# Patient Record
Sex: MALE | Race: WHITE
[De-identification: names, ages, dates, MRNs, and addresses within clinical notes are randomized per-mention and may not be internally consistent; named-entity substitution may affect disease eponyms.]

---

## 2019-11-08 NOTE — MHHPEPDOC
General


Date Of Admission:  Nov 7, 2019


Legal Status:  9.39


Chief Complaint


"I've had thoughts of suicide most of my life."





History of Present Illness


HISTORY OF THE PRESENT ILLNESS: Patient is a 19 -year-old , AD, male, 

with history of depression and SA by hanging that he never sought out help for 

and no previous psychiatric admissions who was brought to ED by his friends 

after he endorsed SI to them with plan to either cut himself or OD.  In ED pt 

had cuts to his forearm and neck that weren't severe and bandaged to prevent 

bleeding as well as multiple scars on his arms from previous cutting and burn 

cigarettes on them.  Pt in ED stated that he has felt depressed and hopeless 

most of his life and came across as guarded, irritable, making sarcastic 

responses when seen.  He denied any current stressors stating "if I knew I'd 

tell you."





Psychiatric Review of Systems


Depression (2 or more weeks):  depressed mood, feelings of worthlesness, 

suicidal thoughts


Christel (4 or more days of):  denies


Psychosis:  denies


Anxiety:  gen/non-specific anxiety


Anxiety/ 6 months or more of:  difficulty concentrating, irritability, muscle 

tension





Past Psychiatric History


Previous Psychiatric Diagnosis: Depression


Previous Psychiatric Admissions: denies


Suicide Attempts: SA by hanging that he never sought help for, self harm thru 

cutting and burning self with cigarettes


Psychiatric Follow-up: [facility]


Psychiatric medications: lexapro 10mg daily





Past Medical History


Medical Problems


denies


Head Injury:  No


Seizures:  No


Hospitalizations:  No


Surgeries:  Yes (tonsilectomy)





Family Medical/Psychiatric HX


Medical Problems


noncontributory


Psychiatric Disorders:  Yes (believes his mother suffers from mental health 

problems)


Addiction:  No


Suicide Attemps/Completions:  No





Addiction History


nicotine, alcohol (bal 0.05 (drank 1 beer last night, only drinks occasionally))





Social History


Childhood: Born and raised in [place], moved to [place] in 8th grade, 2 parent ho

me until they divorce when he was 5y/o and mother remarried, has close 

relationship with parents, stepfather; 2 step brothers he gets along with well. 

Good childhood overall.


Abuse/Trauma:denies


Current Living Situation: [place]


Education: high school grad


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 

apprentice, getting certification for microbiology analytics 


Legal: denies


Marital: single, never , no kids





Mental Status Examination


General Appearance:  well groomed, appears stated age, hospital scubs/clothing, 

lacerations (lt forearm and neck mild), healed scars (arms)


Build:  average


Demeanor:  average, guarded


Eye Contact:  average


Activity:  average


Behavior:  cooperative


Speech:  clear, spontaneous, reg/rate,rhythm,volume


Mood:  depressed, anxious, irritable


Mood


"I've felt suicidal all my life."


Affect:  constricted, flat, congruent


Thought Process:  logical/linear, depressed, intact


Thought Content (Delusions):  none reported, denies SI, HI, AVH (endorses 

chronic SI most of his life with plan to cut himself or OD)


Thought Content (Other):  guarded, appropriate


Thought Content (Aggressive):  none reported


Perception (Hallucinations):  none reported


Perception (Other):  none reported


Cognition (Impairment of):  none reported


Cognition(Intelligence Est.):  average


Oriented:  Awake, Alert, Oriented times three


Insight:  poor


Judgment:  Poor


Psychosis:  Denies





Diagnoses


Major Depressive D/O recurrent severe w/o psychosis


R/O cluster b traits





A-FIB/CHADSVASC


A-FIB History


Current/History of A-Fib/PAF?:  No





Assessment


Pt seen and states he's here b/c he's felt depressed and suicidal most of his 

life.  Denies any traumatic events in his life for him to feel the was he dose. 

States the first time he had thoughts of "not wanting to be around at 5 or 5y/[age]"

but denies that anything happened during the time that he can related it too.  

States he feels overall "numb" and harms himself to feel some type of emotion.  

States now when he self harms he feels "under-whelmed" and self-harms himself 

more severely to feel something due to not feeling enough emotional 

relief/exhilaration if his cuts/burn are mild.  Discussed methodology of CBT for

improvement of chronic depressive thoughts as admit thinking of suicidal makes 

him feel better and less depressed and it appears to be more a maladaptive 

coping mechanism for him.  States he started lexapro 1mo ago but hasn't really 

notices any improvement in his mood and is agreeable to increasing it. He is 

future oriented toward job interests in the future (refer social history) and is

very interested in tattoos and knows a lot of information about them that he 

discussed them with confidence and appeared in affect to have a euthymic and 

bright mood during that time.  Denies SI currently, denies HI, hallucinations, 

delusions.  Feels safe here.





Initial Treatment Plan


1. Patient was admitted on a 9.39 status.


2. Complete history was obtained.


3. With patients permission, family will be contacted and database will be 

expanded. 


4. Patients medication regimen will be reviewed and changed accordingly. 


5. Patient will be provided with protected environment. 


6. Patient will be treated with individual, group, and milieu therapies. 


7. Patient will receive supportive psych-education.


8. Discharge planning will commence immediately.


9. Outpatient follow-up treatment will be strongly recommended.


10. The initial treatment plan will focus initially on:


* Depression.


* Risk for suicide.


11. increase lexapro to 20mg daily for mood





ESTIMATED LENGTH OF STAY: 5-7 DAYS.





TIME SPENT COUNSELING AND COORDINATING INITIAL CARE: 60 minutes.





Vital Signs





Vital Signs








  Date Time  Temp Pulse Resp B/P (MAP) Pulse Ox O2 Delivery O2 Flow Rate FiO2


 


11/8/19 09:57      Room Air  


 


11/8/19 06:47 97.7 67 12 119/59 (79)    


 


11/8/19 00:11     96   











Laboratory Data


[facility] Labs


Laboratory Tests 2


11/7/19 22:02: 


Nucleated Red Blood Cells % (auto) 0.0, Anion Gap 6L, Calcium Level 9.0, Total 

Bilirubin 0.4, Direct Bilirubin < 0.1, Aspartate Amino Transf (AST/SGOT) 14, 

Alanine Aminotransferase (ALT/SGPT) 32, Alkaline Phosphatase 118H, Total Protein

7.7, Albumin 4.4, Albumin/Globulin Ratio 1.33, Thyroid Stimulating Hormone (TSH)

0.766, Salicylates Level 2.3L, Urine Opiates Screen NEGATIVE, Urine Methadone 

Screen NEGATIVE, Acetaminophen Level < 2.0L, Urine Barbiturates Screen NEGATIVE,

Urine Phencyclidine Screen NEGATIVE, Urine Amphetamines Screen NEGATIVE, Urine 

Benzodiazepines Screen NEGATIVE, Urine Cocaine Metabolite Screen NEGATIVE, Urine

Cannabinoids Screen NEGATIVE, Ethyl Alcohol Level 0.050H


CBC/BMP


Laboratory Tests


11/7/19 22:02











Medications


Scheduled


Escitalopram Oxalate (Lexapro) 10 Mg Tablet, 10 MG PO DAILY, (Reported)





Allergies


Coded Allergies:  


     No Known Allergies (Unverified , 11/7/19)











SHARATH ROBLES DO          Nov 8, 2019 11:36 am

## 2019-11-08 NOTE — HPEPDOC
General


Date of Admission


Nov 7, 2019 at 23:34


Date of Service:  Nov 8, 2019


Chief Complaint


The patient is a 19-year-old male admitted with a reason for visit of 

Unspecified  Depressive D/O.





History of Present Illness


19 years old white male with past medical history of no significant disease was 

admitted to inpatient mental health unit with the diagnosis of suicidal 

ideations.


Patient offers no new medical complaints on interview





Home Medications


Scheduled


Escitalopram Oxalate (Lexapro) 10 Mg Tablet, 10 MG PO DAILY, (Reported)





Allergies


Coded Allergies:  


     No Known Allergies (Unverified , 11/7/19)





Past Medical History


Medical History


No medical history of any significant disease


Surgical History


Tonsillectomy





Family History


Significant Family History:  No pertinent family hx





Social History


* Smoker:  current smoker


Alcohol:  occationally


Drugs:  denies





A-FIB/CHADSVASC


A-FIB History


Current/History of A-Fib/PAF?:  No





Review of Systems


Constitutional:  Denies: Chills, Fever, Malaise, Night Sweats, Weakness, 

Fatigue, Weight Loss, Lethargy, Other


Eyes:  Denies: Pain, Vision change, Conjunctivae inflammation, Eyelid inflammati

on, Redness, Other


ENT:  Denies: Head Aches, Ear Pain, Dysphagia, Sinus Congestion, Post Nasal 

Drip, Sore Throat, Epistaxis, Other Symptoms


Skin:  Denies: Rash, Lesions, Jaundice, Bruising, Itching, Dry, Breakdown, Nail 

Changes, Other


Pulmonary:  Denies: Dyspnea, Cough, Pleuritic Chest Pain, Other Symptoms


Cardiovascular:  Denies: Chest Pain, Palpitations, Orthopnea, Paroxysmal Noc. 

Dyspnea, Edema, Lt Headedness, Other Symptoms


Gastrointestinal:  Denies: Nausea, Vomiting, Abdominal Pain, Diarrhea, 

Constipation, Melena, Hematochezia, Other Symptoms


Genitourinary:  Denies: Dysuria, Frequency, Incontinence, Hematuria, Retention, 

Other Symptoms


Hematologic:  Denies: Bruising, Bleeding Excessively, Petecchia, Purpura, 

Enlarged Lymph Nodes, Other Hematologic


Endocrine:  Denies: Polydipsia, Polyphagia, Polyuria, Heat Intolerance, Cold 

Intolerance, Other Endocrine Sx


Musculoskeletal:  Denies: Neck Pain, Back Pain, Shoulder Pain, Arm Pain, Hand 

Pain, Leg Pain, Foot Pain, Joint Pain, Muscle Pain, Spasms, Other Symptoms


Neurological:  Denies: Weakness, Numbness, Incoordination, Change in speech, 

Confusion, Seizures, Other Symptoms


Psych:  Denies: Mood Normal, Anxiety, Depression, Memory Issues, Thoughts of 

Self Harm, Anger, Thoughts of Harming Other, Other Psych





Physical Examination


General Exam:  Positive: Alert, Cooperative


Eye Exam:  Positive: PERRLA, Conjunctiva & lids normal


ENT Exam:  Positive: Atraumatic


Neck Exam:  Positive: Supple, JVD


Chest Exam:  Positive: Clear to auscultation, Normal air movement


Heart Exam:  Positive: Rate Normal, Normal S1, Normal S2


Abdomen Exam:  Positive: Normal bowel sounds, Soft


Extremity Exam:  Positive: Normal pulses


Skin Exam:  Positive: Nl turgor and temperature


Neuro Exam:  Positive: Strength at 5/5 X4 ext, Sensation Intact


Psych Exam:  Positive: Mental status NL, Oriented x 3





Vital Signs





Vital Signs








  Date Time  Temp Pulse Resp B/P (MAP) Pulse Ox O2 Delivery O2 Flow Rate FiO2


 


11/8/19 06:47 97.7 67 12 119/59 (79)  Room Air  


 


11/8/19 00:11     96   











Laboratory Data


Labs 24[facility]


Laboratory Tests 2


11/7/19 22:02: 


Nucleated Red Blood Cells % (auto) 0.0, Anion Gap 6L, Calcium Level 9.0, Total 

Bilirubin 0.4, Direct Bilirubin < 0.1, Aspartate Amino Transf (AST/SGOT) 14, 

Alanine Aminotransferase (ALT/SGPT) 32, Alkaline Phosphatase 118H, Total Protein

7.7, Albumin 4.4, Albumin/Globulin Ratio 1.33, Thyroid Stimulating Hormone (TSH)

0.766, Salicylates Level 2.3L, Urine Opiates Screen NEGATIVE, Urine Methadone 

Screen NEGATIVE, Acetaminophen Level < 2.0L, Urine Barbiturates Screen NEGATIVE,

Urine Phencyclidine Screen NEGATIVE, Urine Amphetamines Screen NEGATIVE, Urine 

Benzodiazepines Screen NEGATIVE, Urine Cocaine Metabolite Screen NEGATIVE, Urine

Cannabinoids Screen NEGATIVE, Ethyl Alcohol Level 0.050H


CBC/BMP


Laboratory Tests


11/7/19 22:02











Problems





(1) Suicidal ideations


Status:  Acute


Problem Text:  19 years old white male with past medical history and no sig

nificant disease, active smoker, admitted with the diagnosis of suicidal 

ideations.


Patient offers no medical complaint the present time


Patient admitted to inpatient mental health unit


Individual and group therapies as per psych


Negation intervention as per psychiatry





(2) Depressive disorder, not elsewhere classified


Status:  Chronic


Problem Text:  As per psychiatry





(3) Hypokalemia


Status:  Acute


Problem Text:  KCl 40 mEq by mouth 1 dose


Balanced diet. Counseling was done


All other electrolytes and CBC are essentially within normal limits








Plan / VTE


VTE Prophylaxis Ordered?:  No


VTE Exclusion Mechanical Proph:  Low Risk for VTE


VTE Exclusion Pharmacological:  At Low Risk for VTE











JESUS VILLANUEVA MD               Nov 8, 2019 09:57

## 2019-11-09 NOTE — MHIPNPDOC
[facility] Progress Note


Progress Note








Inpatient Progress Note


Paulino Leger


MRN: N/[id number]


Date of Birth: N/A


Date of Service: 11/09/2019


History of Present Illness


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 

becoming severely depressed and suicidal.





Interval History


The patient's met with today. He reports he's doing "okay." He's met in his room

where he's sleeping. He does report feeling tired in the bed and reports he has 

not noticed any benefits from his medications at this time, however, he also 

reports he has not had any detriment. He has been engaging in programming, but 

has not engaged in a significant behavioral problems and has been generally 

amenable to staff. He reports that his depression still is difficult to deal 

with with some low mood and loss of interest at times.





Review Of Systems


General: Denies fever or appetite changes





Cardiovascular: Denies Chest pain or palpations





GI: Denies Nausea, vomiting, or bowel changes





Respiratory: Denies shortness of breath or cough





Neuro: Denies dizziness, tremors





Derm: Denies any rashes or pruritus





: Denies any dysuria or urinary dysfunction





MSK: Denies any muscle tightness or stiffness





HEENT: Denies any vision changes or headaches





Heme/Lymph: denies any bruising or bleeding





Endo: denies any cold/heat intolerance or water intake changes





Psychotherapy


None on this visit.





Vital Signs


Reviewed.





Mental Status Examination


General: Well dressed with good hygiene





Speech: Spontaneous and fluid





Thought processes: Linear and logical





MSK: Smooth and coordinated gait, no signs of tremors or involuntary orofacial 

movements





Thought content: Future orientated





Abstract reasoning, and computation: Intact





Description of associations: Intact





Description of abnormal or psychotic thoughts: Denies any suicidal or homicidal 

ideation. Denies any auditory or visual hallucinations. Does not appear to be 

responding to internal stimuli. Does not appear to be endorsing any bizarre or 

paranoid ideation.





Judgment: fair





Insight: fair





Orientation: Alert and orientated 3





Cognition: Grossly normal





Recent and remote memory: Intact





Attention span and concentration: Intact





Fund of knowledge: Adequate





Mood: "Fine"





Affect: Dysthymic with a constricted range





Diagnoses


Unspecified depression.





Assessment and Plan


Depressive disorder: Continue medications as below.





Disposition


The patient will need a further inpatient admission in order to safety plan and 

observe on medications. Potential discharge on Tuesday has been discussed, 

however, further observation will be needed.





Time Spent


15 minutes face-to-face.





Saturday





Vital Signs





Vital Signs








  Date Time  Temp Pulse Resp B/P (MAP) Pulse Ox O2 Delivery O2 Flow Rate FiO2


 


11/9/19 07:35 98.0 74 12 114/57 (76)    


 


11/8/19 09:57      Room Air  


 


11/8/19 00:11     96   











Current Medications





Current Medications








 Medications


  (Trade)  Dose


 Ordered  Sig/Ruben


 Route


 PRN Reason  Start Time


 Stop Time Status Last Admin


Dose Admin


 


 Acetaminophen


  (Tylenol Tab)  650 mg  Q6HP  PRN


 PO


 HEADACHE or DISCOMFORT  11/7/19 23:45


     





 


 Al Hydrox/Mg


 Hydrox/Simethicone


  (Mylanta)  30 ml  Q4HP  PRN


 PO


 HEARTBURN/INDIGESTION  11/7/19 23:45


     





 


 Escitalopram


 Oxalate


  (Lexapro)  20 mg  DAILY


 PO


   11/9/19 09:00


    11/9/19 08:49





 


 Folic Acid


  (Folic Acid)  1 mg  DAILY


 PO


   11/8/19 09:00


 11/8/19 10:05 DC 11/8/19 09:24





 


 Home Med


  (Med Rec


 Complete!)    ASDIRECTED


 XX


   11/7/19 23:45


 11/7/19 23:44 DC  





 


 Lorazepam


  (Ativan)  2 mg  ASDIRECTED  PRN


 PO


 SEE PROTOCOL  11/7/19 23:45


   Cancel  





 


 Magnesium


 Hydroxide


  (Milk Of


 Magnesia)  30 ml  DAILYPRN  PRN


 PO


 CONSTIPATION  11/7/19 23:45


     





 


 Multivitamins


  (Theragram-M)  1 tab  DAILY


 PO


   11/8/19 09:00


 11/8/19 10:05 DC 11/8/19 09:24





 


 Nicotine


  (Nicoderm Cq


 21mg)  1 patch  DAILY


 TD


   11/8/19 09:00


    11/9/19 08:49





 


 Thiamine HCl


  (Thiamine HCl)  100 mg  BID


 PO


   11/7/19 21:00


 11/10/19 09:01  11/9/19 08:49





 


 Trazodone HCl


  (Desyrel)  50 mg  QHSP  PRN


 PO


 INSOMNIA  11/7/19 23:45


     














Allergies


Coded Allergies:  


     No Known Allergies (Unverified , 11/7/19)











VANDANA GUNN DO               Nov 9, 2019 09:53

## 2019-11-10 NOTE — MHIPNPDOC
[facility] Progress Note


Progress Note








Inpatient Progress Note


Paulino Leger


MRN: N/A


Date of Birth: N/A


Date of Service: 11/10/2019


History of Present Illness


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 

becoming severely depressed and suicidal.





Interval History


The patient has met with today. He reports his depression is improving with less

fatigue and loss of interest. His sleep was interrupted by a cold draft, but 

otherwise reports that he is doing well. He reports that he feels somewhat 

sedated in the daytime after taking his Lexapro. Other than that he reports and 

per nursing staff note that he is doing well on the unit. No major behavioral 

problems going to groups.





Review Of Systems


General: Denies fever or appetite changes 





Cardiovascular: Denies Chest pain or palpations





GI: Denies Nausea, vomiting, or bowel changes





Respiratory: Denies shortness of breath or cough





Neuro: Denies dizziness, tremors





Derm: Denies any rashes or pruritus





: Denies any dysuria or urinary problems 





MSK: Denies any muscle tightness or stiffness





HEENT: Denies any vision changes or headaches





Heme/Lymph: denies any bruising or bleeding





Endo: denies any cold/heat intolerance or water intake changes





Psychotherapy


None on this visit.





Vital Signs


Reviewed.





Mental Status Examination


General: Well dressed with good hygiene





Speech: Spontaneous and fluid





Thought processes: Linear and logical





MSK: Smooth and coordinated gait, no signs of tremors or involuntary orofacial 

movements





Thought content: Future orientated





Abstract reasoning, and computation: Intact





Description of associations: Intact





Description of abnormal or psychotic thoughts: Denies any suicidal or homicidal 

ideation. Denies any auditory or visual hallucinations. Does not appear to be 

responding to internal stimuli. Does not appear to be endorsing any bizarre or 

paranoid ideation.





Judgment: fair





Insight: fair





Orientation: Alert and orientated 3





Cognition: Grossly normal





Recent and remote memory: Intact





Attention span and concentration: Intact





Fund of knowledge: Adequate





Mood: "okay"





Affect: Euthymic with a full range





Diagnoses


Unspecified depression.





Assessment and Plan


Depressive disorder: We'll change Lexapro to QHS dosing.





Disposition


Per Dr. Hernandez' plan.





Time Spent


10 minutes face-to-face.





Sunday





Vital Signs





Vital Signs








  Date Time  Temp Pulse Resp B/P (MAP) Pulse Ox O2 Delivery O2 Flow Rate FiO2


 


11/10/19 06:29 98.7 69 12 102/62 (75)    


 


11/8/19 09:57      Room Air  


 


11/8/19 00:11     96   











Current Medications





Current Medications








 Medications


  (Trade)  Dose


 Ordered  Sig/Ruben


 Route


 PRN Reason  Start Time


 Stop Time Status Last Admin


Dose Admin


 


 Acetaminophen


  (Tylenol Tab)  650 mg  Q6HP  PRN


 PO


 HEADACHE or DISCOMFORT  11/7/19 23:45


     





 


 Al Hydrox/Mg


 Hydrox/Simethicone


  (Mylanta)  30 ml  Q4HP  PRN


 PO


 HEARTBURN/INDIGESTION  11/7/19 23:45


     





 


 Escitalopram


 Oxalate


  (Lexapro)  20 mg  DAILY


 PO


   11/9/19 09:00


    11/10/19 08:36





 


 Folic Acid


  (Folic Acid)  1 mg  DAILY


 PO


   11/8/19 09:00


 11/8/19 10:05 DC 11/8/19 09:24





 


 Home Med


  (Med Rec


 Complete!)    ASDIRECTED


 XX


   11/7/19 23:45


 11/7/19 23:44 DC  





 


 Lorazepam


  (Ativan)  2 mg  ASDIRECTED  PRN


 PO


 SEE PROTOCOL  11/7/19 23:45


   Cancel  





 


 Magnesium


 Hydroxide


  (Milk Of


 Magnesia)  30 ml  DAILYPRN  PRN


 PO


 CONSTIPATION  11/7/19 23:45


     





 


 Multivitamins


  (Theragram-M)  1 tab  DAILY


 PO


   11/8/19 09:00


 11/8/19 10:05 DC 11/8/19 09:24





 


 Nicotine


  (Nicoderm Cq


 21mg)  1 patch  DAILY


 TD


   11/8/19 09:00


    11/10/19 08:42





 


 Thiamine HCl


  (Thiamine HCl)  100 mg  BID


 PO


   11/7/19 21:00


 11/10/19 09:01 DC 11/10/19 08:36





 


 Trazodone HCl


  (Desyrel)  50 mg  QHSP  PRN


 PO


 INSOMNIA  11/7/19 23:45


     














Allergies


Coded Allergies:  


     No Known Allergies (Unverified , 11/7/19)











VANDANA GUNN DO              Nov 10, 2019 12:57

## 2019-11-11 NOTE — MHIPNPDOC
[facility] Progress Note


Progress Note


DATE OF SERVICE: 11/11/19





HISTORY: Patient is a 19 -year-old , AD, male, with history of 

depression and SA by hanging that he never sought out help for and no previous 

psychiatric admissions who was brought to ED by his friends after he endorsed SI

to them with plan to either cut himself or OD.  In ED pt had cuts to his forearm

and neck that weren't severe and bandaged to prevent bleeding as well as 

multiple scars on his arms from previous cutting and burn cigarettes on them.  

Pt in ED stated that he has felt depressed and hopeless most of his life and 

came across as guarded, irritable, making sarcastic responses when seen.  He 

denied any current stressors stating "if I knew I'd tell you."


Pt seen and states he's here b/c he's felt depressed and suicidal most of his 

life.  Denies any traumatic events in his life for him to feel the was he dose. 

States the first time he had thoughts of "not wanting to be around at 5 or 7y/o"

but denies that anything happened during the time that he can related it too.  

States he feels overall "numb" and harms himself to feel some type of emotion.  

States now when he self harms he feels "under-whelmed" and self-harms himself 

more severely to feel something due to not feeling enough emotional 

relief/exhilaration if his cuts/burn are mild.  Discussed methodology of CBT for

improvement of chronic depressive thoughts as admit thinking of suicidal makes 

him feel better and less depressed and it appears to be more a maladaptive 

coping mechanism for him.  States he started lexapro 1mo ago but hasn't really 

notices any improvement in his mood and is agreeable to increasing it. He is 

future oriented toward job interests in the future (refer social history) and is

very interested in tattoos and knows a lot of information about them that he 

discussed them with confidence and appeared in affect to have a euthymic and 

bright mood during that time.  Denies SI currently, denies HI, hallucinations, 

delusions.  Feels safe here.





VITAL SIGNS: See below.





NEW TEST RESULTS: See below.





CURRENT MEDICATIONS: See below.





MENTAL STATUS EXAMINATION:


General Appearance:  well groomed, appears stated age, hospital scrubs/clothing,

lacerations (lt forearm and neck mild), healed scars (arms)


Build:  average


Demeanor:  average, cooperative


Eye Contact:  average


Activity:  average


Behavior:  cooperative


Speech:  clear, spontaneous, reg/rate,rhythm,volume


Mood:  less depressed, less anxious


Mood


"better"


Affect:  more full range and euthymic, congruent


Thought Process:  logical/linear, less depressed, intact


Thought Content (Delusions):  none reported, denies SI, HI, AVH (denies SI with 

plan to cut himself or OD)


Thought Content (Other):  appropriate


Thought Content (Aggressive):  none reported


Perception (Hallucinations):  none reported


Perception (Other):  none reported


Cognition (Impairment of):  none reported


Cognition(Intelligence Est.):  average


Oriented:  Awake, Alert, Oriented times three


Insight:  fair


Judgment:  fair


Psychosis:  Denies





DIAGNOSES:


Major Depressive D/O recurrent severe w/o psychosis


R/O cluster b traits


 


ASSESSMENT:Pt seen and states that his mood is better with the increase in 

lexapro.  States he's finding it less sedating since he started taking it 

nightly.  States he slept well last night.  Feels he is tolerating his 

medications and they're beneficial. He is attending groups and finding them 

helpful.  Denies thoughts to self harm since he's been here.  Appears to have 

more full, euthymic affect.  He denies SI/HI, hallucinations, delusions.  Pt 

feels safe here.





MANAGEMENT PLAN: continue plan


Medications:


lexapro to 20mg qhs


trazodone 50mg qhs prn insomnia





TIME SPENT: 30 minutes.





Vital Signs





Vital Signs








  Date Time  Temp Pulse Resp B/P (MAP) Pulse Ox O2 Delivery O2 Flow Rate FiO2


 


11/11/19 06:37 98.8 68 16 105/65 (78)    


 


11/8/19 09:57      Room Air  


 


11/8/19 00:11     96   











Current Medications





Current Medications








 Medications


  (Trade)  Dose


 Ordered  Sig/Ruben


 Route


 PRN Reason  Start Time


 Stop Time Status Last Admin


Dose Admin


 


 Acetaminophen


  (Tylenol Tab)  650 mg  Q6HP  PRN


 PO


 HEADACHE or DISCOMFORT  11/7/19 23:45


     





 


 Al Hydrox/Mg


 Hydrox/Simethicone


  (Mylanta)  30 ml  Q4HP  PRN


 PO


 HEARTBURN/INDIGESTION  11/7/19 23:45


     





 


 Escitalopram


 Oxalate


  (Lexapro)  20 mg  DAILY


 PO


   11/9/19 09:00


 11/10/19 19:28 DC 11/10/19 08:36





 


 Escitalopram


 Oxalate


  (Lexapro)  20 mg  QHS


 PO


   11/10/19 21:00


 11/10/19 20:36 DC  





 


 Escitalopram


 Oxalate


  (Lexapro)  20 mg  QHS


 PO


   11/10/19 21:00


 11/10/19 21:03 DC  





 


 Escitalopram


 Oxalate


  (Lexapro)  20 mg  QHS


 PO


   11/11/19 21:00


     





 


 Folic Acid


  (Folic Acid)  1 mg  DAILY


 PO


   11/8/19 09:00


 11/8/19 10:05 DC 11/8/19 09:24





 


 Home Med


  (Med Rec


 Complete!)    ASDIRECTED


 XX


   11/7/19 23:45


 11/7/19 23:44 DC  





 


 Lorazepam


  (Ativan)  2 mg  ASDIRECTED  PRN


 PO


 SEE PROTOCOL  11/7/19 23:45


   Cancel  





 


 Magnesium


 Hydroxide


  (Milk Of


 Magnesia)  30 ml  DAILYPRN  PRN


 PO


 CONSTIPATION  11/7/19 23:45


     





 


 Multivitamins


  (Theragram-M)  1 tab  DAILY


 PO


   11/8/19 09:00


 11/8/19 10:05 DC 11/8/19 09:24





 


 Nicotine


  (Nicoderm Cq


 21mg)  1 patch  DAILY


 TD


   11/8/19 09:00


    11/10/19 08:42





 


 Thiamine HCl


  (Thiamine HCl)  100 mg  BID


 PO


   11/7/19 21:00


 11/10/19 09:01 DC 11/10/19 08:36





 


 Trazodone HCl


  (Desyrel)  50 mg  QHSP  PRN


 PO


 INSOMNIA  11/7/19 23:45


     














Allergies


Coded Allergies:  


     No Known Allergies (Unverified , 11/7/19)











SHARATH ROBLES DO          Nov 11, 2019 8:34 am

## 2019-11-12 NOTE — MHDSPDOC
[facility] Discharge Summary


Discharge Summary


DATE OF ADMISSION: Nov 7, 2019 at 11:34 pm 


DATE OF DISCHARGE: Nov 12, 2019





DISCHARGE DIAGNOSES:


Major Depressive D/O recurrent severe w/o psychosis


R/O cluster b traits





REASON FOR ADMISSION:Patient is a 19 -year-old , AD, male, with history

of depression and SA by hanging that he never sought out help for and no 

previous psychiatric admissions who was brought to ED by his friends after he 

endorsed SI to them with plan to either cut himself or OD.  In ED pt had cuts to

his forearm and neck that weren't severe and bandaged to prevent bleeding as 

well as multiple scars on his arms from previous cutting and burn cigarettes on 

them.  Pt in ED stated that he has felt depressed and hopeless most of his life 

and came across as guarded, irritable, making sarcastic responses when seen.  He

denied any current stressors stating "if I knew I'd tell you."


Pt seen and states he's here b/c he's felt depressed and suicidal most of his 

life.  Denies any traumatic events in his life for him to feel the was he dose. 

States the first time he had thoughts of "not wanting to be around at 5 or 5y/o"

but denies that anything happened during the time that he can related it too.  

States he feels overall "numb" and harms himself to feel some type of emotion.  

States now when he self harms he feels "under-whelmed" and self-harms himself 

more severely to feel something due to not feeling enough emotional 

relief/exhilaration if his cuts/burn are mild.  Discussed methodology of CBT for

improvement of chronic depressive thoughts as admit thinking of suicidal makes 

him feel better and less depressed and it appears to be more a maladaptive 

coping mechanism for him.  States he started lexapro 1mo ago but hasn't really 

notices any improvement in his mood and is agreeable to increasing it. He is 

future oriented toward job interests in the future (refer social history) and is

very interested in tattoos and knows a lot of information about them that he 

discussed them with confidence and appeared in affect to have a euthymic and 

bright mood during that time.  Denies SI currently, denies HI, hallucinations, 

delusions.  Feels safe here.





CONSULTANTS INVOLVED: none





TREATMENT AND PROGRESS ON THE UNIT : Pt was admitted to [facility], seen for 

psychiatric assessment and started on lexapro increase to 20mg and change to 

nightly dosing for daytime fatigue with daily dosing.  He was provided trazodone

50mg qhs prn insomnia.  Pt found his medications beneficial and tolerated them 

well.  He attended groups daily during his stay.  His symptoms of depression, 

thoughts/urges to self harm, and SI improved with treatment.  He was educated on

coping tools he could do to prevent him from future self harm and stated he 

would do them after discharge.  On day of discharge he denied depression, 

anxiety, insomnia, SI/HI, hallucinations, delusions.  He was discharged home 

after [employer] meeting with follow-up at [facility].  He felt safe for discharge.





DISCHARGE ASSESSMENT: Pt seen and states that his mood is good and he's looking 

forward to going home with his [employer] today.  States he's finding his lexapro 

beneficial for his mood and is tolerating it well.  States he slept well last 

night.  He denies urges to self harm and SI.  He is attending groups and finding

them helpful.  Denies thoughts to self harm since he's been here.  Appears to 

have a full, euthymic affect.  He denies depression, anxiety, insomnia, 

thoughts/urges to self harm, SI/HI, hallucinations, delusions.  Pt feels safe to

be discharged home with his [employer] today.





MENTAL STATUS EXAMINATION ON DISCHARGE: 


General Appearance:  well groomed, appears stated age, hospital scrubs/clothing,

lacerations (lt forearm and neck mild), healed scars (arms)


Build:  average


Demeanor:  average, cooperative


Eye Contact:  average


Activity:  average


Behavior:  cooperative


Speech:  clear, spontaneous, reg/rate,rhythm,volume


Mood:  euthymic, full range


Mood


"good"


Affect:  full range and euthymic, congruent


Thought Process:  logical/linear, intact


Thought Content (Delusions):  none reported, denies SI, HI, AVH 


Thought Content (Other):  appropriate


Thought Content (Aggressive):  none reported


Perception (Hallucinations):  none reported


Perception (Other):  none reported


Cognition (Impairment of):  none reported


Cognition(Intelligence Est.):  average


Oriented:  Awake, Alert, Oriented times three


Insight:  fair-good


Judgment:  good


Psychosis:  Denies





MEDICATIONS ON DISCHARGE:


lexapro to 20mg qhs


trazodone 50mg qhs prn insomnia





PLAN/FOLLOWUP ARRANGEMENTS: D/c home with [facility] with follow-up at [facility].





The amount of time spent in the coordination of care for this patient was 

approximately 30 minutes.





Vital Signs/I&Os





Vital Signs








  Date Time  Temp Pulse Resp B/P (MAP) Pulse Ox O2 Delivery O2 Flow Rate FiO2


 


11/12/19 06:32 98.7 73 12 122/69 (86)  Room Air  


 


11/8/19 00:11     96   











Medications


Scheduled


Escitalopram Oxalate (Lexapro) 10 Mg Tablet, 10 MG PO DAILY, (Reported)





Allergies


Coded Allergies:  


     No Known Allergies (Unverified , 11/7/19)











SHARATH ROBLES DO          Nov 12, 2019 8:49 am

## 2020-01-21 ENCOUNTER — HOSPITAL ENCOUNTER (INPATIENT)
Dept: HOSPITAL 53 - M ED | Age: 20
LOS: 3 days | Discharge: PSYCHIATRIC OR SUBSTANCE USE FACILITY | DRG: 885 | End: 2020-01-24
Attending: PSYCHIATRY & NEUROLOGY | Admitting: PSYCHIATRY & NEUROLOGY
Payer: COMMERCIAL

## 2020-01-21 VITALS — WEIGHT: 161.16 LBS | HEIGHT: 66 IN | BODY MASS INDEX: 25.9 KG/M2

## 2020-01-21 VITALS — SYSTOLIC BLOOD PRESSURE: 122 MMHG | DIASTOLIC BLOOD PRESSURE: 68 MMHG

## 2020-01-21 VITALS — DIASTOLIC BLOOD PRESSURE: 73 MMHG | SYSTOLIC BLOOD PRESSURE: 117 MMHG

## 2020-01-21 DIAGNOSIS — F17.200: ICD-10-CM

## 2020-01-21 DIAGNOSIS — R45.851: ICD-10-CM

## 2020-01-21 DIAGNOSIS — Z79.899: ICD-10-CM

## 2020-01-21 DIAGNOSIS — Z88.8: ICD-10-CM

## 2020-01-21 DIAGNOSIS — F33.2: Primary | ICD-10-CM

## 2020-01-21 LAB
ALBUMIN SERPL BCG-MCNC: 4.5 GM/DL (ref 3.2–5.2)
ALT SERPL W P-5'-P-CCNC: 32 U/L (ref 12–78)
AMPHETAMINES UR QL SCN: NEGATIVE
APAP SERPL-MCNC: < 2 UG/ML (ref 10–30)
BARBITURATES UR QL SCN: NEGATIVE
BENZODIAZ UR QL SCN: NEGATIVE
BILIRUB CONJ SERPL-MCNC: 0.1 MG/DL (ref 0–0.2)
BILIRUB SERPL-MCNC: 0.3 MG/DL (ref 0.2–1)
BUN SERPL-MCNC: 12 MG/DL (ref 7–18)
BZE UR QL SCN: NEGATIVE
CALCIUM SERPL-MCNC: 9.7 MG/DL (ref 8.5–10.1)
CANNABINOIDS UR QL SCN: NEGATIVE
CHLORIDE SERPL-SCNC: 105 MEQ/L (ref 98–107)
CO2 SERPL-SCNC: 29 MEQ/L (ref 21–32)
CREAT SERPL-MCNC: 0.9 MG/DL (ref 0.7–1.3)
ETHANOL SERPL-MCNC: < 0.003 % (ref 0–0.01)
GLUCOSE SERPL-MCNC: 106 MG/DL (ref 70–100)
HCT VFR BLD AUTO: 49.9 % (ref 42–52)
HGB BLD-MCNC: 16.6 G/DL (ref 13.5–17.5)
MCH RBC QN AUTO: 30 PG (ref 27–33)
MCHC RBC AUTO-ENTMCNC: 33.3 G/DL (ref 32–36.5)
MCV RBC AUTO: 90.1 FL (ref 80–96)
METHADONE UR QL SCN: NEGATIVE
OPIATES UR QL SCN: NEGATIVE
PCP UR QL SCN: NEGATIVE
PLATELET # BLD AUTO: 222 10^3/UL (ref 150–450)
POTASSIUM SERPL-SCNC: 4.2 MEQ/L (ref 3.5–5.1)
PROT SERPL-MCNC: 7.5 GM/DL (ref 6.4–8.2)
RBC # BLD AUTO: 5.54 10^6/UL (ref 4.3–6.1)
SALICYLATES SERPL-MCNC: < 1.7 MG/DL (ref 5–30)
SODIUM SERPL-SCNC: 140 MEQ/L (ref 136–145)
TSH SERPL DL<=0.005 MIU/L-ACNC: 0.56 UIU/ML (ref 0.46–3.98)
WBC # BLD AUTO: 8.4 10^3/UL (ref 4–10)

## 2020-01-21 NOTE — HPEPDOC
General


Date of Admission


Jan 21, 2020 at 02:12


Date of Service:  Jan 21, 2020


Chief Complaint


The patient is a 19-year-old male admitted with a reason for visit of 

Unspecified Depressive D/O.


Source:  Patient


Exam Limitations:  No limitations


Timing/Duration:  Other (not applicable)


Severity:  Other (not applicable)


Associated Symptoms:  Other (not applicable)





History of Present Illness


19 years old white male with past medical history of no significant medical 

disease, had an argument with his girlfriend and became suicidal and sent by 

ambulance to emergency room for psychiatric care.


Patient was admitted to inpatient mental health unit with suicidal ideations. 

Patient denies any other medical issues or complaints at the present time.





Home Medications


Scheduled


Sertraline Hcl (Zoloft) 50 Mg Tablet, 50 MG PO DAILY, (Reported)





Allergies


Coded Allergies:  


     sertraline (Verified  Adverse Reaction, Severe, SUICIDAL THOUGHTS, 

DROWSINESS, 1/21/20)





Past Medical History


Medical History


None


Surgical History


Tonsillectomy





Family History


Significant Family History:  No pertinent family hx





Social History


* Smoker:  current smoker


Alcohol:  occationally


Drugs:  denies





A-FIB/CHADSVASC


A-FIB History


Current/History of A-Fib/PAF?:  No





Review of Systems


Constitutional:  Denies: Chills, Fever, Malaise, Night Sweats, Weakness, 

Fatigue, Weight Loss, Lethargy, Other


Eyes:  Denies: Pain, Vision change, Conjunctivae inflammation, Eyelid 

inflammation, Redness, Other


ENT:  Denies: Head Aches, Ear Pain, Dysphagia, Sinus Congestion, Post Nasal 

Drip, Sore Throat, Epistaxis, Other Symptoms


Skin:  Denies: Rash, Lesions, Jaundice, Bruising, Itching, Dry, Breakdown, Nail 

Changes, Other


Pulmonary:  Denies: Dyspnea, Cough, Pleuritic Chest Pain, Other Symptoms


Cardiovascular:  Denies: Chest Pain, Palpitations, Orthopnea, Paroxysmal Noc. 

Dyspnea, Edema, Lt Headedness, Other Symptoms


Gastrointestinal:  Denies: Nausea, Vomiting, Abdominal Pain, Diarrhea, 

Constipation, Melena, Hematochezia, Other Symptoms


Genitourinary:  Denies: Dysuria, Frequency, Incontinence, Hematuria, Retention, 

Other Symptoms


Hematologic:  Denies: Bruising, Bleeding Excessively, Petecchia, Purpura, 

Enlarged Lymph Nodes, Other Hematologic


Endocrine:  Denies: Polydipsia, Polyphagia, Polyuria, Heat Intolerance, Cold 

Intolerance, Other Endocrine Sx


Neurological:  Denies: Weakness, Numbness, Incoordination, Change in speech, 

Confusion, Seizures, Other Symptoms


Psych:  Reports: Other Psych





Physical Examination


General Exam:  Positive: Alert, Cooperative


Eye Exam:  Positive: PERRLA, Conjunctiva & lids normal


ENT Exam:  Positive: Atraumatic, Mucous membr. moist/pink


Neck Exam:  Positive: Supple


Chest Exam:  Positive: Clear to auscultation, Normal air movement


Heart Exam:  Positive: Rate Normal, Normal S1, Normal S2


Abdomen Exam:  Positive: Normal bowel sounds, Soft


Extremity Exam:  Positive: Normal pulses


Skin Exam:  Positive: Nl turgor and temperature


Neuro Exam:  Positive: Strength at 5/5 X4 ext, Cranial Nerves 3-12 NL


Psych Exam:  Positive: Mood NL, Oriented x 3





Vital Signs





Vital Signs








  Date Time  Temp Pulse Resp B/P (MAP) Pulse Ox O2 Delivery O2 Flow Rate FiO2


 


1/21/20 03:14 98.7 68 18 117/73 (88) 99 Room Air  











Laboratory Data


Labs 24H


Laboratory Tests 2


1/21/20 01:05: 


Nucleated Red Blood Cells % (auto) 0.0, Anion Gap 6L, Calcium Level 9.7, Total 

Bilirubin 0.3, Direct Bilirubin 0.1, Aspartate Amino Transf (AST/SGOT) 19, 

Alanine Aminotransferase (ALT/SGPT) 32, Alkaline Phosphatase 91, Total Protein 

7.5, Albumin 4.5, Albumin/Globulin Ratio 1.50, Thyroid Stimulating Hormone (TSH)

0.557, Salicylates Level < 1.7L, Urine Opiates Screen NEGATIVE, Urine Methadone 

Screen NEGATIVE, Acetaminophen Level < 2.0L, Urine Barbiturates Screen NEGATIVE,

Urine Phencyclidine Screen NEGATIVE, Urine Amphetamines Screen NEGATIVE, Urine 

Benzodiazepines Screen NEGATIVE, Urine Cocaine Metabolite Screen NEGATIVE, Urine

Cannabinoids Screen NEGATIVE, Ethyl Alcohol Level < 0.003


CBC/BMP


Laboratory Tests


1/21/20 01:05











Problems





(1) Suicidal ideation


Status:  Acute


Problem Text:  Patient admitted to inpatient mental health unit for psychiatric 

care


Individual and group counseling, as per schedule


Medication adjustment as per psychiatry





(2) Depression


Status:  Acute


Problem Text:  As per psychiatry, his intervention


All lab work was essentially within normal limits


Please call if any followed up is needed








Plan / VTE


VTE Prophylaxis Ordered?:  Yes











JESUS VILLANUEVA MD              Jan 21, 2020 15:32

## 2020-01-21 NOTE — MHHPEPDOC
[facility] History & Physical


History and Physical


DATE OF ADMISSION: Jan 21, 2020 at 02:12








New Patient


Paulino Leger


MRN: N/A


Date of Birth: N/A


Date of Service: 01/21/2020


Chief Complaint


"I'm just so depressed."





History of Present Illness


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 He presents reporting significant depressed

mood, loss of interest, concentration problems, and a plan to kill himself. He 

reports that in the separation from the  he has found himself lost and 

unable to cope, becoming immersed in severe depression. He is attempted to be 

met with, however his depression is so severe that he stares off during the 

interview for the most part and interacts very little, nearly entirely resigned 

to his bed. He is able to only answer a few questions and a more intensive 

interview is not able to be taken, as he only responds at some times due to the 

severity of his symptoms.





The psychosocial information is taken from previous assessments and updated as 

appropriate.





Review Of Systems


Depression: As above.





Anxiety: Unable to inquiry.





Christel: Unable to inquiry.





Psychotic: Unable to inquiry.





Trauma: Unable to inquiry.





Borderline: Unable to inquiry.





Past Psychiatric History


The patient has a history of depression, treated in our inpatient unit in 

November with both Lexapro and sertraline with negative effects. Has been 

attending behavioral health.





Allergies


Please see below.





Family Psychiatric History


The patient denies/is unaware any history of mental health history including 

addictions and suicide (confirmed)





Social History


Born in [place], parents  in 8th grade, close relationship with step father 

and sibs. No abuse or trauma noted. Lives in [place] [facility], [place] for

misconduct, 2.5 years in the [employer], no deployments, E4, looking for work right 

now after, no children, never , 





Substance Abuse History


smokes 1 ppd, intermittent alcohol use, denied in past illicit drug use





Medical History


Patient has no significant past medical history.





Mental Status Examination


General: Well dressed with good hygiene





Speech: Only answers if asked





Thought processes: Linear





MSK: Significant psychomotor retardation 





Thought content: Profound hopelessness





Abstract reasoning, and computation: Intact





Description of associations: Mildly impaired





Description of abnormal or psychotic thoughts: Admits to suicidal thoughts with 

no attention to act on the unit, but plans to hang himself





Judgment: impaired





Insight: impaired





Orientation: Alert and orientated 3





Cognition: Slowed





Recent and remote memory: Appears to be impaired





Attention span and concentration: Appears to be impaired





Fund of knowledge: Adequate





Mood: "bad"





Affect: Profoundly dysthymic with a constricted range





Diagnoses


Major depressive disorder, recurrent, severe, without psychotic features.





Tobacco use disorder, moderate.





Assessment and Plan


MDD: Start Wellbutrin 150 mg extended release. Discussed the risks, benefits, 

and potential side effects as well as alternative treatments with patient.





Tobacco use disorder: Offer nicotine patch.





Disposition


The patient will need a further inpatient admission due to his severe impairing 

depression causing psychomotor retardation and suicidal thoughts. His severity 

will likely necessitate long-term treatment, of which he has consented to.





Problem List


1. Risk for suicide.





2. Depression.





3. Ineffective coping.





Initial Treatment Plan


1. Patient was admitted on a 9.39 legal status. 


2. Complete history was obtained.


3. With patients permission, family will be contacted and database will be 

expanded. 


4. Patients medication regimen will be reviewed and changed accordingly. 


5. Patient will be provided with protected environment. 


6. Patient will be treated with individual, group, and milieu therapies. 


7. Patient will receive supportive psych-education.


8. Discharge planning will commence immediately.


9. Outpatient follow-up treatment will be strongly recommended.


10. The initial treatment plan will focus initially on:





Estimated Length Of Stay


5 days.





Time Spent


70 minutes.





Tuesday





Vital Signs





Vital Signs








  Date Time  Temp Pulse Resp B/P (MAP) Pulse Ox O2 Delivery O2 Flow Rate FiO2


 


1/21/20 03:14 98.7 68 18 117/73 (88) 99 Room Air  











Laboratory Data


24H Labs


Laboratory Tests 2


1/21/20 01:05: 


Nucleated Red Blood Cells % (auto) 0.0, Anion Gap 6L, Calcium Level 9.7, Total 

Bilirubin 0.3, Direct Bilirubin 0.1, Aspartate Amino Transf (AST/SGOT) 19, 

Alanine Aminotransferase (ALT/SGPT) 32, Alkaline Phosphatase 91, Total Protein 

7.5, Albumin 4.5, Albumin/Globulin Ratio 1.50, Thyroid Stimulating Hormone (TSH)

0.557, Salicylates Level < 1.7L, Urine Opiates Screen NEGATIVE, Urine Methadone 

Screen NEGATIVE, Acetaminophen Level < 2.0L, Urine Barbiturates Screen NEGATIVE,

Urine Phencyclidine Screen NEGATIVE, Urine Amphetamines Screen NEGATIVE, Urine 

Benzodiazepines Screen NEGATIVE, Urine Cocaine Metabolite Screen NEGATIVE, Urine

Cannabinoids Screen NEGATIVE, Ethyl Alcohol Level < 0.003


CBC/BMP


Laboratory Tests


1/21/20 01:05











Medications


Scheduled


Sertraline Hcl (Zoloft) 50 Mg Tablet, 50 MG PO DAILY, (Reported)





Allergies


Coded Allergies:  


     sertraline (Verified  Adverse Reaction, Severe, SUICIDAL THOUGHTS, 

DROWSINESS, 1/21/20)











VANDANA GUNN DO              Jan 21, 2020 09:26

## 2020-01-22 VITALS — SYSTOLIC BLOOD PRESSURE: 115 MMHG | DIASTOLIC BLOOD PRESSURE: 55 MMHG

## 2020-01-22 VITALS — SYSTOLIC BLOOD PRESSURE: 118 MMHG | DIASTOLIC BLOOD PRESSURE: 63 MMHG

## 2020-01-22 NOTE — MHIPNPDOC
[facility] Progress Note


Progress Note








Inpatient Progress Note


Paulino Leger


MRN: N/A


Date of Birth: N/A


Date of Service: 01/22/2020


History of Present Illness


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 He presents reporting significant depressed

mood, loss of interest, concentration problems, and a plan to kill himself. He 

reports that in the separation from the  he has found himself lost and u

nable to cope, becoming immersed in severe depression. He is attempted to be met

with, however his depression is so severe that he stares off during the 

interview for the most part and interacts very little, nearly entirely resigned 

to his bed. He is able to only answer a few questions and a more intensive 

interview is not able to be taken, as he only responds at some times due to the 

severity of his symptoms.





The psychosocial information is taken from previous assessments and updated as 

appropriate.





Interval History


Psychiatric symptoms today:





Affective: Patient reports improving mood, less loss of interest, better 

concentration and more social engagement today.





Psychotic: Patient denies any psychotic symptoms or paranoia.





Anxiety: Patient denies any anxious, worries at this time.





Misc: The patient reports improving thought process.





Group Attendance: Attends groups infrequently.





Medication Side effects: See ROS below





Behavioral problems/significant events overnight: No major behavioral problems 

overnight.





Staff Report: Patient is more visible and active in the milieu.





Review Of Systems


Cardiovascular: Denies Chest pain or palpations





GI: Denies Nausea, vomiting, or bowel changes





Respiratory: Denies shortness of breath or cough





Neuro: Denies dizziness, tremors





Derm: Denies any rashes or pruritus





: Denies any dysuria or urinary problems 





MSK: Denies any muscle tightness or stiffness





HEENT: Denies any vision changes or headaches





Psychotherapy


None on this visit.





Vital Signs


Reviewed.





Mental Status Examination


General: Well dressed with good hygiene





Speech: Improved.





Thought processes: Linear





MSK: Improved.





Thought content: Improved.





Abstract reasoning, and computation: Intact





Description of associations: Mildly impaired





Description of abnormal or psychotic thoughts: Denies any suicidal or homicidal 

thoughts at this time. Denies auditory or visual hallucinations. 





Judgment: Improved.





Insight: Improved.





Orientation: Alert and orientated 3





Cognition: Improved.





Recent and remote memory: Improved.





Attention span and concentration: Improved.





Fund of knowledge: Adequate





Mood: "Okay."





Affect: Less dysthymic.





Diagnoses


Major depressive disorder, recurrent, severe, without psychotic features.





Tobacco use disorder, moderate.





Assessment and Plan


MDD: Increase Wellbutrin to 300 mg extended release daily.





Tobacco use disorder: Offer nicotine patch.





Disposition


Triage for long-term treatment, will likely leave later this week.





Time Spent


15 minutes face-to-face.





Wednesday





Vital Signs





Vital Signs








  Date Time  Temp Pulse Resp B/P (MAP) Pulse Ox O2 Delivery O2 Flow Rate FiO2


 


1/22/20 06:00 98.4 57 16 115/55 (75)    


 


1/21/20 03:14     99 Room Air  











Current Medications





Current Medications








 Medications


  (Trade)  Dose


 Ordered  Sig/Ruben


 Route


 PRN Reason  Start Time


 Stop Time Status Last Admin


Dose Admin


 


 Acetaminophen


  (Tylenol Tab)  650 mg  Q6HP  PRN


 PO


 HEADACHE or DISCOMFORT  1/21/20 02:15


     





 


 Al Hydrox/Mg


 Hydrox/Simethicone


  (Mylanta)  30 ml  Q4HP  PRN


 PO


 HEARTBURN/INDIGESTION  1/21/20 02:15


     





 


 Bupropion HCl


  (Wellbutrin Xl)  150 mg  DAILY


 PO


   1/22/20 09:00


     





 


 Home Med


  (Med Rec


 Complete!)    ASDIRECTED


 XX


   1/21/20 02:30


 1/21/20 02:25 DC  





 


 Magnesium


 Hydroxide


  (Milk Of


 Magnesia)  30 ml  DAILYPRN  PRN


 PO


 CONSTIPATION  1/21/20 02:15


     





 


 Olanzapine


  (ZyPREXA


 **ZYDIS**)  5 mg  Q4HP  PRN


 PO


 AGITATION  1/21/20 02:15


     





 


 Trazodone HCl


  (Desyrel)  50 mg  QHSP  PRN


 PO


 INSOMNIA  1/21/20 02:15


     














Allergies


Coded Allergies:  


     sertraline (Verified  Adverse Reaction, Severe, SUICIDAL THOUGHTS, 

DROWSINESS, 1/21/20)











VANDANA GUNN DO              Jan 22, 2020 07:26

## 2020-01-23 VITALS — DIASTOLIC BLOOD PRESSURE: 59 MMHG | SYSTOLIC BLOOD PRESSURE: 118 MMHG

## 2020-01-23 VITALS — DIASTOLIC BLOOD PRESSURE: 67 MMHG | SYSTOLIC BLOOD PRESSURE: 108 MMHG

## 2020-01-23 NOTE — MHDSPDOC
[facility] Discharge Summary


Discharge Summary


DATE OF ADMISSION: Jan 21, 2020 at 02:12 


DATE OF DISCHARGE: 1/23/20











Discharge


Paulino Leger


MRN: N/A


Date of Birth: N/A


Date of Service: 01/23/2020


Diagnoses


Major depressive disorder, recurrent, severe, without psychotic features.





Tobacco use disorder, moderate.





History of Present Illness


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 He presents reporting significant depressed

mood, loss of interest, concentration problems, and a plan to kill himself. He 

reports that in the separation from the  he has found himself lost and 

unable to cope, becoming immersed in severe depression. He is attempted to be 

met with, however his depression is so severe that he stares off during the 

interview for the most part and interacts very little, nearly entirely resigned 

to his bed. He is able to only answer a few questions and a more intensive 

interview is not able to be taken, as he only responds at some times due to the 

severity of his symptoms.





The psychosocial information is taken from previous assessments and updated as 

appropriate.





Consultants Involved


Hospitalist/PCP screening





Treatment and Progress On The Unit


The patient was admitted to the inpatient unit with significant suicidal 

thoughts and depression. Started on Wellbutrin 150 mg, tapered up to 300 mg 

daily. He made positive changes, became more amenable, less depressed, more able

to enjoy being around others and less isolative. He attended groups more 

frequently and did much improved. He had no major behavioral problems during his

stay. His chain of command was concerned to his fair depression and had asked if

he would be amenable to go into a long-term treatment facility of which the 

patient consented, thus the patient was triaged for long-term treatment and will

be discharged later this evening around 3 in the morning.





Discharge Assessment


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 

long-term treatment. He is started initially on Wellbutrin up to 300 mg and will

be discharged at 3 AM this evening for transportation to long-term treatment in 

[place].





Mental Status Examination


General: Well dressed with good hygiene





Speech: Improved.





Thought processes: Linear and logical





MSK: Smooth and coordinated gait, no signs of tremors or involuntary orofacial 

movements





Thought content: Less hopelessness.





Abstract reasoning, and computation: Intact





Description of associations: Intact





Description of abnormal or psychotic thoughts: Denies any suicidal or homicidal 

ideation. Denies any auditory or visual hallucinations. Does not appear to be 

responding to internal stimuli. Does not appear to be endorsing any bizarre or 

paranoid ideation.





Judgment: fair





Insight: fair





Orientation: Alert and orientated 3





Cognition: Grossly normal





Recent and remote memory: Intact





Attention span and concentration: Intact





Fund of knowledge: Adequate





Mood: "okay"





Affect: Less dysthymic.





Follow Up








The social work team worked during the predischarge meeting in order to evaluate

for further issues of lethality address them fully before discharge. They worked

on safety planning with the patient's family members in order to ensure that the

patient will have a safe and effective discharge.





Time Spent


The amount of time spent in the coordination of care for this patient was 

approximately 40 minutes.





Thursday





Vital Signs/I&Os





Vital Signs








  Date Time  Temp Pulse Resp B/P (MAP) Pulse Ox O2 Delivery O2 Flow Rate FiO2


 


1/23/20 06:29 98.4 63 12 108/67 (81)  Room Air  


 


1/21/20 03:14     99   











Medications


Scheduled


Bupropion Hcl (Bupropion Xl) 150 Mg Tab.er.24h, 300 MG PO DAILY for mood for 7 

Days, #7





Allergies


Coded Allergies:  


     sertraline (Verified  Adverse Reaction, Severe, SUICIDAL THOUGHTS, 

DROWSINESS, 1/21/20)











VANDANA GUNN DO              Jan 23, 2020 11:04

## 2020-01-23 NOTE — MHIPNPDOC
[facility] Progress Note


Progress Note


DATE OF SERVICE: 1/23/20





HISTORY: .





VITAL SIGNS: See below.





NEW TEST RESULTS: .





CURRENT MEDICATIONS: See below.





MENTAL STATUS EXAMINATION:


Patient is a -year old male, who is .


Speech: Is .


Language skills are .


Thought processes including: .


Thought content: . Abstract reasoning, and computation: . Description of associ

ations: .


Description of abnormal or psychotic thoughts: .


Judgment: .


Insight: [very limited, good, fair. poor].


Orientation: .


Recent and remote memory: .


Attention span and concentration: .


Language: .


Fund of knowledge: .


Mood: . Affect: .





DIAGNOSES:


1. .


2. .


3. .


 


ASSESSMENT:





MANAGEMENT PLAN: .





TIME SPENT:  minutes.





Vital Signs





Vital Signs








  Date Time  Temp Pulse Resp B/P (MAP) Pulse Ox O2 Delivery O2 Flow Rate FiO2


 


1/23/20 06:29 98.4 63 12 108/67 (81)  Room Air  


 


1/21/20 03:14     99   











Current Medications





Current Medications








 Medications


  (Trade)  Dose


 Ordered  Sig/Ruben


 Route


 PRN Reason  Start Time


 Stop Time Status Last Admin


Dose Admin


 


 Acetaminophen


  (Tylenol Tab)  650 mg  Q6HP  PRN


 PO


 HEADACHE or DISCOMFORT  1/21/20 02:15


     





 


 Al Hydrox/Mg


 Hydrox/Simethicone


  (Mylanta)  30 ml  Q4HP  PRN


 PO


 HEARTBURN/INDIGESTION  1/21/20 02:15


     





 


 Bupropion HCl


  (Wellbutrin Xl)  150 mg  DAILY


 PO


   1/22/20 09:00


 1/22/20 08:22 DC  





 


 Bupropion HCl


  (Wellbutrin Xl)  300 mg  DAILY


 PO


   1/23/20 09:00


    1/23/20 08:28





 


 Home Med


  (Med Rec


 Complete!)    ASDIRECTED


 XX


   1/21/20 02:30


 1/21/20 02:25 DC  





 


 Magnesium


 Hydroxide


  (Milk Of


 Magnesia)  30 ml  DAILYPRN  PRN


 PO


 CONSTIPATION  1/21/20 02:15


     





 


 Olanzapine


  (ZyPREXA


 **ZYDIS**)  5 mg  Q4HP  PRN


 PO


 AGITATION  1/21/20 02:15


    1/22/20 19:43





 


 Trazodone HCl


  (Desyrel)  50 mg  QHSP  PRN


 PO


 INSOMNIA  1/21/20 02:15


     














Allergies


Coded Allergies:  


     sertraline (Verified  Adverse Reaction, Severe, SUICIDAL THOUGHTS, 

DROWSINESS, 1/21/20)











VANDANA GUNN DO              Jan 23, 2020 10:52

## 2020-02-28 ENCOUNTER — HOSPITAL ENCOUNTER (INPATIENT)
Dept: HOSPITAL 53 - M ED | Age: 20
LOS: 4 days | Discharge: HOME | DRG: 881 | End: 2020-03-03
Attending: PSYCHIATRY & NEUROLOGY | Admitting: PSYCHIATRY & NEUROLOGY
Payer: COMMERCIAL

## 2020-02-28 VITALS — HEIGHT: 66 IN | WEIGHT: 160.28 LBS | BODY MASS INDEX: 25.76 KG/M2

## 2020-02-28 DIAGNOSIS — F17.200: ICD-10-CM

## 2020-02-28 DIAGNOSIS — Z88.8: ICD-10-CM

## 2020-02-28 DIAGNOSIS — F32.9: Primary | ICD-10-CM

## 2020-02-28 DIAGNOSIS — E87.6: ICD-10-CM

## 2020-02-28 DIAGNOSIS — F10.10: ICD-10-CM

## 2020-02-28 DIAGNOSIS — R45.851: ICD-10-CM

## 2020-02-28 LAB
HCT VFR BLD AUTO: 46.7 % (ref 42–52)
HGB BLD-MCNC: 16.1 G/DL (ref 13.5–17.5)
MCH RBC QN AUTO: 30.4 PG (ref 27–33)
MCHC RBC AUTO-ENTMCNC: 34.5 G/DL (ref 32–36.5)
MCV RBC AUTO: 88.3 FL (ref 80–96)
PLATELET # BLD AUTO: 208 10^3/UL (ref 150–450)
RBC # BLD AUTO: 5.29 10^6/UL (ref 4.3–6.1)
WBC # BLD AUTO: 6.3 10^3/UL (ref 4–10)

## 2020-02-29 VITALS — DIASTOLIC BLOOD PRESSURE: 73 MMHG | SYSTOLIC BLOOD PRESSURE: 134 MMHG

## 2020-02-29 VITALS — DIASTOLIC BLOOD PRESSURE: 83 MMHG | SYSTOLIC BLOOD PRESSURE: 123 MMHG

## 2020-02-29 LAB
ALBUMIN SERPL BCG-MCNC: 4.5 GM/DL (ref 3.2–5.2)
ALT SERPL W P-5'-P-CCNC: 34 U/L (ref 12–78)
AMPHETAMINES UR QL SCN: NEGATIVE
APAP SERPL-MCNC: < 2 UG/ML (ref 10–30)
BARBITURATES UR QL SCN: NEGATIVE
BENZODIAZ UR QL SCN: NEGATIVE
BILIRUB CONJ SERPL-MCNC: 0.2 MG/DL (ref 0–0.2)
BILIRUB SERPL-MCNC: 0.4 MG/DL (ref 0.2–1)
BUN SERPL-MCNC: 11 MG/DL (ref 7–18)
BZE UR QL SCN: NEGATIVE
CALCIUM SERPL-MCNC: 9.5 MG/DL (ref 8.5–10.1)
CANNABINOIDS UR QL SCN: NEGATIVE
CHLORIDE SERPL-SCNC: 106 MEQ/L (ref 98–107)
CO2 SERPL-SCNC: 27 MEQ/L (ref 21–32)
CREAT SERPL-MCNC: 0.89 MG/DL (ref 0.7–1.3)
ETHANOL SERPL-MCNC: 0.09 % (ref 0–0.01)
GLUCOSE SERPL-MCNC: 92 MG/DL (ref 70–100)
METHADONE UR QL SCN: NEGATIVE
OPIATES UR QL SCN: NEGATIVE
PCP UR QL SCN: NEGATIVE
POTASSIUM SERPL-SCNC: 3.4 MEQ/L (ref 3.5–5.1)
PROT SERPL-MCNC: 7.5 GM/DL (ref 6.4–8.2)
SALICYLATES SERPL-MCNC: 2.6 MG/DL (ref 5–30)
SODIUM SERPL-SCNC: 141 MEQ/L (ref 136–145)
TSH SERPL DL<=0.005 MIU/L-ACNC: 1.08 UIU/ML (ref 0.46–3.98)

## 2020-02-29 RX ADMIN — BUPROPION HYDROCHLORIDE SCH MG: 150 TABLET, FILM COATED, EXTENDED RELEASE ORAL at 09:00

## 2020-02-29 RX ADMIN — NICOTINE SCH PATCH: 21 PATCH, EXTENDED RELEASE TRANSDERMAL at 17:13

## 2020-02-29 RX ADMIN — TRAZODONE HYDROCHLORIDE PRN MG: 50 TABLET ORAL at 21:23

## 2020-02-29 NOTE — ECGEPIP
[facility] - ED

                                       

                                       Test Date:    2020

Pat Name:     LEE ANN BECERRA            Department:   

Patient ID:   [id number]                 Room:         -

Gender:       Male                     Technician:   vish

:          2000               Requested By: Bernard RIBEIRO

Order Number: [id number]     Reading MD:   Mia Niño

                                 Measurements

Intervals                              Axis          

Rate:         62                       P:            60

MN:           162                      QRS:          81

QRSD:         108                      T:            53

QT:           381                                    

QTc:          389                                    

                           Interpretive Statements

SINUS RHYTHM WITH SINUS ARRHYTHMIA

No prior

Electronically Signed on 2020 17:51:04 EST by Mia Niño

## 2020-02-29 NOTE — HPEPDOC
General


Date of Admission


Feb 29, 2020 at 12:06


Date of Service:  Feb 29, 2020


Chief Complaint


The patient is a 19-year-old male  who presented to the emergency room after a 

witness reported suicidal ideation





History of Present Illness


Patient is a 19 year old  male with a PMHx of Depression who presented 

to emergency room because a witness noted that he had suicidal ideation when he 

was seen and admitted with his girlfriend. She has a history of prior suicidal 

ideation and has higher admissions inpatient mental health unit. Patient is 

currently under the care of psychiatry and has been admitted to the patient [facility] 

inpatient mental health unit. Hospital services called for medical screening 

evaluation.





Patient denies any headache, nausea, vomiting, chest pain, shortness breath, 

palpitations, cough, abdominal pain, constipation, diarrhea, or urinary 

discomfort. Patient denies any fevers, chills.





Patient reports that his appetite has been normal in has not reported any signif

icant changes in his weight.





Home Medications


Scheduled


Bupropion HCl (Bupropion Xl) 450 Mg Tab.er.24h, 450 MG PO DAILY, (Reported)


   PT WAS TAKING MEDICATION AT [facility] TERM [facility]ARE [facility] IN [place] 





Allergies


Coded Allergies:  


     sertraline (Verified  Adverse Reaction, Severe, SUICIDAL THOUGHTS, 

DROWSINESS, 1/21/20)





Past Medical History


Medical History


Depression


Surgical History


Tonsillectomy





Family History


- Family history was reviewed and currently noncontributory to current 

hospitalization





Social History


- Patient reports he is a smoker for 8 years at 1 [employer], reports that he drinks 

alcohol, patient also has reported cocaine use, last of which was 2 months ago


- Denies recent travel or sick contacts


[place]


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





Review of Systems


Other systems


10 point review of systems complete, all negative otherwise stated in HPI





Vital Signs


- Vitals: /73, HR 78, RR 16, Sat 98%RA, Temp 98.3F


- General: Lying in bed, No acute distress, Speaking in full sentences, AAOx3


- HEENT: NC, AT, PERRLA


- CVS: RRR, +S1S2


- Lungs: Fair air entry bilaterally, No appreciable wheezing / rales / rhonchi 


- Abdomen: Soft, Non-distended, Non-tender


- Extremities: No lower extremity edema, No calf tenderness


- Neuro: No focal motor or sensory deficit


- Skin: No visible rashes





Laboratory Data


Labs 24H


Laboratory Tests 2


2/28/20 23:28: 


Nucleated Red Blood Cells % (auto) 0.0, Anion Gap 8, Calcium Level 9.5, Total 

Bilirubin 0.4, Direct Bilirubin 0.2, Aspartate Amino Transf (AST/SGOT) 45H, 

Alanine Aminotransferase (ALT/SGPT) 34, Alkaline Phosphatase 105, Total Protein 

7.5, Albumin 4.5, Albumin/Globulin Ratio 1.50, Thyroid Stimulating Hormone (TSH)

1.080, Salicylates Level 2.6L, Urine Opiates Screen NEGATIVE, Urine Methadone 

Screen NEGATIVE, Acetaminophen Level < 2.0L, Urine Barbiturates Screen NEGATIVE,

Urine Phencyclidine Screen NEGATIVE, Urine Amphetamines Screen NEGATIVE, Urine 

Benzodiazepines Screen NEGATIVE, Urine Cocaine Metabolite Screen NEGATIVE, Urine

Cannabinoids Screen NEGATIVE, Ethyl Alcohol Level 0.092H


CBC/BMP


Laboratory Tests


2/28/20 23:28











Plan / VTE


VTE Prophylaxis Ordered?:  Yes





Plan


Plan


Depression / Suspected suicidal ideation


- Patient was admitted to the inpatient mental health unit under the care of 

psychiatry


- Patient has a history of prior admissions to inpatient mental health unit


- Currently being managed by psychiatry





Hypokalemia


- Will supplement 





DVT prophylaxis 


- c/w early ambulation 





Thank you for this consultation; please reconsult as needed











ION RANGEL MD                Feb 29, 2020 16:50

## 2020-03-01 VITALS — DIASTOLIC BLOOD PRESSURE: 83 MMHG | SYSTOLIC BLOOD PRESSURE: 138 MMHG

## 2020-03-01 VITALS — DIASTOLIC BLOOD PRESSURE: 57 MMHG | SYSTOLIC BLOOD PRESSURE: 100 MMHG

## 2020-03-01 RX ADMIN — BUPROPION HYDROCHLORIDE SCH MG: 150 TABLET, FILM COATED, EXTENDED RELEASE ORAL at 10:19

## 2020-03-01 RX ADMIN — TRAZODONE HYDROCHLORIDE PRN MG: 50 TABLET ORAL at 21:15

## 2020-03-01 RX ADMIN — NICOTINE SCH PATCH: 21 PATCH, EXTENDED RELEASE TRANSDERMAL at 10:19

## 2020-03-01 NOTE — MHHPE
DATE OF ADMISSION:   02/29/2020

 

VITAL SIGNS:  Blood pressure 134/73, pulse 78, temperature 98.3.

 

CHIEF COMPLAINT:   Says feels stressed.

 

SUBJECTIVE:   He is 19 years old.  He is single. He is in the , active

duty.  He has had at least one prior hospitalization, was here about 1 month ago

and then sent to [facility] in [facility] term [facility], which he says was

combined with rehabilitation and mental health.  He says that he did well there.

He had been discharged on Wellbutrin at 300 mg daily, this was increased to 450

mg.  He says that he felt better since then and on the lower dose of trazodone at

25 mg at night for insomnia.

 

He says that he has felt quite well, suggests that it is the best that he has

felt for a while, upon discharge from the facility there.  He says that he is

back at [facility] and says that he continued to do well, essentially.  He says

that he occasionally had cravings for alcohol, very occasionally for cocaine, but

that whenever he has had the craving for alcohol that he has talked to a friend

and that has been helpful.  He has been distracted.

 

He says he and his girlfriend, who is also in the , were having an

argument in the barracks yesterday, and apparently they were shouting at each

other.  He says that he was intoxicated.  He says that he had started drinking as

his friend was not around, he says that this was his first drink in 2 months, and

says an NCO, whom he says does not know him and was himself intoxicated, was

passing by, noticed him, called the  police, and he says the next thing

he knows the police were there and he was brought here.  He says the NCO assumed

that he was suicidal, but the patient suggests that he was not.  He acknowledges

that he was crying, says he had no intentions or any thoughts of hurting himself.

 

 

The emergency room record suggests that Sergeant Cortes was also in the

vicinity, who apparently suggested that the patient made statements indicating

that he wanted to kill himself or that he could not live sober or that he could

not live without cocaine. The patient denies this.  He says that he is aware of

the sergeant and that she does not know him either, but acknowledges that she was

in the vicinity there.

 

Says is not busy at work.  He is in the process of being MEB boarded out.

 

PAST PSYCHIATRIC HISTORY:  Please refer to the previous summary.

 

MENTAL STATUS EXAMINATION:

He is neat.  He is cooperative, though a bit guarded.   There is no agitation.

No psychomotor retardation.  No abnormal movements noted.  He is coherent.

Affect is somewhat restricted in range but reactive.  He denies any suicidal

thoughts or intents. No homicidal ideas or intents.  Currently there is no

evidence of any psychosis.  Cognition is grossly intact.  Intellect is average.

Judgment and insight are questionable.

 

ASSESSMENT:

1.  Other specified depressive disorder.

2.  Alcohol use disorder.

3.  Cocaine use disorder.

4.  Disagreement with girlfriend.

 

He says that he has been doing well and relapsed.  Denies was suicidal.  His

version of events may not necessarily be entirely accurate.

 

PLAN:  He is admitted to the inpatient psychiatry unit.  We will resume the

Wellbutrin at 450 mg daily, cutdown the trazodone  to 25 mg at night as needed

for insomnia.  We will look at obtaining collateral information.  He will receive

a consultation from medicine if indicated.  We will encourage him to participate

in activities in the unit.  He will be discharged with followup back at [facility]

when he is stable.  I would anticipate a 3 to 5 day stay.

 

The assessment took 30 minutes.

## 2020-03-02 VITALS — DIASTOLIC BLOOD PRESSURE: 73 MMHG | SYSTOLIC BLOOD PRESSURE: 128 MMHG

## 2020-03-02 VITALS — SYSTOLIC BLOOD PRESSURE: 101 MMHG | DIASTOLIC BLOOD PRESSURE: 52 MMHG

## 2020-03-02 RX ADMIN — BUPROPION HYDROCHLORIDE SCH MG: 150 TABLET, FILM COATED, EXTENDED RELEASE ORAL at 09:32

## 2020-03-02 RX ADMIN — NICOTINE SCH PATCH: 21 PATCH, EXTENDED RELEASE TRANSDERMAL at 09:33

## 2020-03-02 RX ADMIN — TRAZODONE HYDROCHLORIDE PRN MG: 50 TABLET ORAL at 20:51

## 2020-03-02 NOTE — MHIPNPDOC
[facility] Progress Note


Progress Note








Inpatient Progress Note


Paulino Leger


MRN: N/A


Date of Birth: N/A


Date of Service: 03/02/2020


History of Present Illness


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

suicidal statements while intoxicated.


Interval History


Narrative: The patient reports feeling much improved, feels regretful about his 

behavior.





Affective: Denies any depressive symptoms at this time.





Psychotic: Denies any symptoms at this time.


Anxiety: Denies any symptoms.


Eating and sleeping behaviors: Within normal limits.


Group Attendance: Frequent.


Medication Side effects: See ROS below


Behavioral problems/significant events overnight: None reported.


Staff Report: Friendly and amenable.





Review Of Systems





General: Denies fever or appetite changes 


Cardiovascular: Denies Chest pain or palpations


GI: Denies Nausea, vomiting, or bowel changes


Respiratory: Denies shortness of breath or cough


Neuro: Denies dizziness, tremors


Derm: Denies any rashes or pruritus


: Denies any dysuria or urinary problems 


MSK: Denies any muscle tightness or stiffness


HEENT: Denies any vision changes or headaches





Psychotherapy


None on this visit.


Vital Signs


Reviewed.


Mental Status Examination





General: Well dressed with good hygiene


Speech: Spontaneous and fluid


Thought processes: Linear and logical


MSK: Smooth and coordinated gait, no signs of tremors or involuntary orofacial 

movements


Thought content: Future orientated


Abstract reasoning, and computation: Intact


Description of associations: Intact


Description of abnormal or psychotic thoughts: Denies any suicidal or homicidal 

ideation. Denies any auditory or visual hallucinations. Does not appear to be 

responding to internal stimuli. Does not appear to be endorsing any bizarre or 

paranoid ideation.


Judgment: fair


Insight: fair


Orientation: Alert and orientated 3


Cognition: Grossly normal


Recent and remote memory: Intact


Attention span and concentration: Intact


Fund of knowledge: Adequate


Mood: "okay"


Affect: Euthymic with a full range 


Diagnoses


Unspecified depressive disorder.


Alcohol use disorder, moderate.


Assessment and Plan


Unspecified depressive disorder: Continue Wellbutrin 450 mg daily.


Alcohol use disorder: We'll send script for disulfiram 250 mg daily, discussed 

the risks, benefits, and potential side effects, as well as, alternatives. 

Patient motivated for change.


Disposition


Discharge tomorrow.


Time Spent


15 minutes


Monday





Vital Signs





Vital Signs








  Date Time  Temp Pulse Resp B/P (MAP) Pulse Ox O2 Delivery O2 Flow Rate FiO2


 


3/2/20 06:34 97.5 70 14 101/52 (68)    


 


2/29/20 13:35     98 Room Air  











Current Medications





Current Medications








 Medications


  (Trade)  Dose


 Ordered  Sig/Ruben


 Route


 PRN Reason  Start Time


 Stop Time Status Last Admin


Dose Admin


 


 Acetaminophen


  (Tylenol Tab)  650 mg  Q6HP  PRN


 PO


 HEADACHE or DISCOMFORT  2/29/20 12:15


     





 


 Al Hydrox/Mg


 Hydrox/Simethicone


  (Mylanta)  30 ml  Q4HP  PRN


 PO


 HEARTBURN/INDIGESTION  2/29/20 12:15


     





 


 Bupropion HCl


  (Wellbutrin Xl)  450 mg  DAILY


 PO


   2/29/20 09:00


    3/2/20 09:32





 


 Home Med


  (Med Rec


 Complete!)    ASDIRECTED


 XX


   2/29/20 12:45


 2/29/20 12:38 DC  





 


 Magnesium


 Hydroxide


  (Milk Of


 Magnesia)  30 ml  DAILYPRN  PRN


 PO


 CONSTIPATION  2/29/20 12:15


     





 


 Nicotine


  (Nicoderm Cq


 21mg)  1 patch  DAILY


 TD


   2/29/20 09:00


    3/2/20 09:33





 


 Trazodone HCl


  (Desyrel)  25 mg  QHSP  PRN


 PO


 INSOMNIA  2/29/20 19:45


    3/1/20 21:15





 


 Trazodone HCl


  (Desyrel)  50 mg  QHSP  PRN


 PO


 INSOMNIA  2/29/20 12:15


 2/29/20 19:42 DC  














Allergies


Coded Allergies:  


     sertraline (Verified  Adverse Reaction, Severe, SUICIDAL THOUGHTS, 

DROWSINESS, 1/21/20)











VANDANA GUNN DO               Mar 2, 2020 10:28

## 2020-03-02 NOTE — MHIPN
DATE:  03/01/2020

 

VITAL SIGNS:  Blood pressure 100/57, pulse 85, temperature 98.1.

 

CHIEF COMPLAINT:   Says feels okay.

 

SUBJECTIVE:

Seen for followup.  He is seen in the presence of staff.  Says feels okay, and

has been sleeping through the night, in fact most of the morning as well.  Says

feels rested now.  Used the trazodone 25 mg.  Was _____________________ early on

for a brief while, to have breakfast.  Appetite has generally been okay.

Says has been in touch with friends, regarding visiting hours.  Says has not been

in touch with his girlfriend, he is not sure whether they are together for now or

not.

 

MENTAL STATUS EXAM:

Neat, cooperative.  Appears a bit tired, was just woken up, but is coherent.  No

agitation.  No psychomotor retardation, affect restricted but reactive.  Denies

any suicidal thoughts or intents. No homicidal ideas or intents.  No evidence of

any psychosis.  Cognition grossly intact.  His judgment is good.  Insight fair.

 

ASSESSMENT:

1.  Other specified depressive disorder.

2.  Alcohol _____________________ (dictation cut off).

 

Feels more rested.

 

PLAN:

Continue current care, observation, obtain collateral information.  Encourage

participation in activities in the unit.

He will be seeing the assigned psychiatric as well as the treatment team

tomorrow, and further recommendations will be made.  I do not anticipate a long

stay.

## 2020-03-03 VITALS — DIASTOLIC BLOOD PRESSURE: 58 MMHG | SYSTOLIC BLOOD PRESSURE: 111 MMHG

## 2020-03-03 RX ADMIN — NICOTINE SCH PATCH: 21 PATCH, EXTENDED RELEASE TRANSDERMAL at 08:35

## 2020-03-03 RX ADMIN — BUPROPION HYDROCHLORIDE SCH MG: 150 TABLET, FILM COATED, EXTENDED RELEASE ORAL at 08:34

## 2020-03-03 NOTE — MHDSPDOC
[facility] Discharge Summary


Discharge Summary


DATE OF ADMISSION: Feb 29, 2020 at 12:06 


DATE OF DISCHARGE: 3/3/20








Discharge


Paluino Leger


MRN: [id number]


Date of Birth: N/A


Date of Service: 03/03/2020


Diagnoses


Unspecified depressive disorder.


Alcohol use disorder, moderate. 


History of Present Illness


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

suicidal statements while intoxicated. 


Consultants Involved


Hospitalist/PCP screening 


Treatment and Progress On The Unit


The patient was admitted to the inpatient mental health unit and resumed on his 

home medication of Wellbutrin 450 mg daily. He additionally was on alcohol 

withdrawal precautions. He did well and his depression resolved quite quickly, 

suggesting that this was more a product of intoxication and substance-induced 

than a re-emergence of a major depressive disorder. He was admitted to the unit 

and observed, where he participated well. The patient had no major behavioral 

problems. He eventually was spoken to and was amenable to starting disulfiram as

an outpatient for his alcohol problems that appear to bring him in for care on 

multiple episodes.


Discharge Assessment


19-year-old man with a significant history of alcohol use presents with what 

appears to be substance-induced depression. He does well with minimal 

interventions further suggesting that his substance use is a major issue for 

him.


The patient at the time of discharge did not meet criteria for involuntary 

admission/extension due to having a normal mental status exam, fair insight into

the situation, They are engaged in the discharge process, as well as being 

friendly and amenable in behavioral control and havent been engaging in any 

observed concerning behavior or ideation recently. They decline voluntary 

extension/admission at this time and must be discharged in good sharif, as Im 

unable to make a case for holding the patient against their will. They may have 

historical risk factors of admissions and other interactions with psychiatry 

however, those are not modifiable from a clinical perspective. The patient will 

need to be discharged in good sharif. 


Mental Status Examination


General: Well dressed with good hygiene


Speech: Spontaneous and fluid


Thought processes: Linear and logical


MSK: Smooth and coordinated gait, no signs of tremors or involuntary orofacial 

movements


Thought content: Future orientated


Abstract reasoning, and computation: Intact


Description of associations: Intact


Description of abnormal or psychotic thoughts: Denies any suicidal or homicidal 

ideation. Denies any auditory or visual hallucinations. Does not appear to be 

responding to internal stimuli. Does not appear to be endorsing any bizarre or 

paranoid ideation.


Judgment: fair


Insight: fair


Orientation: Alert and orientated 3


Cognition: Grossly normal


Recent and remote memory: Intact


Attention span and concentration: Intact


Fund of knowledge: Adequate


Mood: "okay"


Affect: Euthymic with a full range


Follow Up








The social work team worked during the predischarge meeting in order to evaluate

for further issues of lethality address them fully before discharge. They worked

on safety planning with the patient's family members in order to ensure that the

patient will have a safe and effective discharge.


Time Spent


The amount of time spent in the coordination of care for this patient was 

approximately 40 minutes. 


Tuesday





Vital Signs/I&Os





Vital Signs








  Date Time  Temp Pulse Resp B/P (MAP) Pulse Ox O2 Delivery O2 Flow Rate FiO2


 


3/3/20 06:35 99.2 74 12 111/58 (75)  Room Air  


 


2/29/20 13:35     98   











Medications


Scheduled


Bupropion HCl (Bupropion Xl) 450 Mg Tab.er.24h, 450 MG PO DAILY for mood for 7 

Days, #7


   PT WAS TAKING MEDICATION AT [facility] [place] 


Disulfiram (Disulfiram) 250 Mg Tablet, 1 TAB PO DAILY for alcohol for 30 Days, 

#30


Nicotine (Nicotine Patch) 21 Mg Patch.td24, 1 PATCH TD DAILY for tobacco for 30 

Days, #30





Scheduled PRN


Trazodone HCl (Trazodone HCl) 50 Mg Tablet, 25 MG PO QHSP PRN for INSOMNIA for 7

Days, #7





Allergies


Coded Allergies:  


     sertraline (Verified  Adverse Reaction, Severe, SUICIDAL THOUGHTS, 

DROWSINESS, 1/21/20)











VANDANA GUNN DO               Mar 3, 2020 10:54